# Patient Record
Sex: FEMALE | Race: BLACK OR AFRICAN AMERICAN | NOT HISPANIC OR LATINO | URBAN - METROPOLITAN AREA
[De-identification: names, ages, dates, MRNs, and addresses within clinical notes are randomized per-mention and may not be internally consistent; named-entity substitution may affect disease eponyms.]

---

## 2024-04-05 ENCOUNTER — OV NP (OUTPATIENT)
Dept: URBAN - METROPOLITAN AREA CLINIC 115 | Facility: CLINIC | Age: 79
End: 2024-04-05
Payer: COMMERCIAL

## 2024-04-05 ENCOUNTER — LAB (OUTPATIENT)
Dept: URBAN - METROPOLITAN AREA CLINIC 115 | Facility: CLINIC | Age: 79
End: 2024-04-05

## 2024-04-05 VITALS
SYSTOLIC BLOOD PRESSURE: 131 MMHG | HEART RATE: 64 BPM | HEIGHT: 64 IN | RESPIRATION RATE: 15 BRPM | WEIGHT: 112.8 LBS | BODY MASS INDEX: 19.26 KG/M2 | DIASTOLIC BLOOD PRESSURE: 85 MMHG | TEMPERATURE: 97 F

## 2024-04-05 DIAGNOSIS — R19.5 LOOSE STOOLS: ICD-10-CM

## 2024-04-05 DIAGNOSIS — R10.13 EPIGASTRIC PAIN: ICD-10-CM

## 2024-04-05 DIAGNOSIS — Z86.010 PERSONAL HISTORY OF COLONIC POLYPS: ICD-10-CM

## 2024-04-05 DIAGNOSIS — R11.14 BILIOUS VOMITING WITH NAUSEA: ICD-10-CM

## 2024-04-05 PROBLEM — 428283002: Status: ACTIVE | Noted: 2024-04-05

## 2024-04-05 PROBLEM — 398032003: Status: ACTIVE | Noted: 2024-04-05

## 2024-04-05 PROBLEM — 71419002: Status: ACTIVE | Noted: 2024-04-05

## 2024-04-05 PROCEDURE — 99204 OFFICE O/P NEW MOD 45 MIN: CPT | Performed by: INTERNAL MEDICINE

## 2024-04-05 RX ORDER — MULTIVIT WITH IRON,MINERALS
AS DIRECTED LIQUID (ML) ORAL
Status: ACTIVE | COMMUNITY

## 2024-04-05 RX ORDER — MELOXICAM 7.5 MG/1
TABLET ORAL
Qty: 0 | Refills: 0 | Status: ON HOLD | COMMUNITY
Start: 1900-01-01

## 2024-04-05 RX ORDER — LOSARTAN POTASSIUM 50 MG/1
TABLET ORAL
Qty: 0 | Refills: 0 | Status: ON HOLD | COMMUNITY
Start: 1900-01-01

## 2024-04-05 RX ORDER — ASPIRIN 81 MG/1
1 TABLET TABLET, CHEWABLE ORAL ONCE A DAY
Status: ACTIVE | COMMUNITY

## 2024-04-05 RX ORDER — OMEPRAZOLE 40 MG/1
1 CAPSULE 30 MINUTES BEFORE MORNING MEAL CAPSULE, DELAYED RELEASE ORAL ONCE A DAY
Qty: 30 CAPSULE | Refills: 1 | OUTPATIENT
Start: 2024-04-05

## 2024-04-05 RX ORDER — BENAZEPRIL HCL 5 MG
1 TABLET TABLET ORAL ONCE A DAY
Status: ACTIVE | COMMUNITY

## 2024-04-05 RX ORDER — ATORVASTATIN CALCIUM 20 MG/1
1 TABLET TABLET, FILM COATED ORAL ONCE A DAY
Status: ACTIVE | COMMUNITY

## 2024-04-05 RX ORDER — AMLODIPINE BESYLATE 10 MG/1
TAKE 1 TABLET (10 MG) BY ORAL ROUTE ONCE DAILY TABLET ORAL 1
Qty: 0 | Refills: 0 | Status: ON HOLD | COMMUNITY
Start: 1900-01-01

## 2024-04-05 RX ORDER — HYDROCODONE BITARTRATE AND IBUPROFEN 7.5; 2 MG/1; MG/1
TABLET ORAL
Qty: 0 | Refills: 0 | Status: ON HOLD | COMMUNITY
Start: 1900-01-01

## 2024-04-05 RX ORDER — TIOTROPIUM BROMIDE INHALATION SPRAY 1.56 UG/1
INHALE 2 PUFFS (2.5 MCG) BY INHALATION ROUTE ONCE DAILY SPRAY, METERED RESPIRATORY (INHALATION) 1
Qty: 0 | Refills: 0 | Status: ON HOLD | COMMUNITY
Start: 1900-01-01

## 2024-04-05 RX ORDER — AMLODIPINE BESYLATE 10 MG/1
1 TABLET TABLET ORAL ONCE A DAY
Status: ACTIVE | COMMUNITY

## 2024-04-05 RX ORDER — TORSEMIDE 10 MG/1
1 TABLET TABLET ORAL ONCE A DAY
Status: ACTIVE | COMMUNITY

## 2024-04-05 RX ORDER — GABAPENTIN 400 MG/1
TAKE 1 CAPSULE (400 MG) BY ORAL ROUTE 3 TIMES PER DAY CAPSULE ORAL
Qty: 0 | Refills: 0 | Status: ON HOLD | COMMUNITY
Start: 1900-01-01

## 2024-04-05 RX ORDER — NORTRIPTYLINE HYDROCHLORIDE 25 MG/1
CAPSULE ORAL
Qty: 0 | Refills: 0 | Status: ON HOLD | COMMUNITY
Start: 1900-01-01

## 2024-04-05 RX ORDER — SERTRALINE 25 MG/1
1 TABLET TABLET, FILM COATED ORAL ONCE A DAY
Status: ACTIVE | COMMUNITY

## 2024-04-05 RX ORDER — SUCRALFATE 1 G/1
1 TABLET ON AN EMPTY STOMACH TABLET ORAL TWICE A DAY
Status: ACTIVE | COMMUNITY

## 2024-04-05 RX ORDER — POLYETHYLENE GLYCOL 3350, SODIUM SULFATE ANHYDROUS, SODIUM BICARBONATE, SODIUM CHLORIDE, POTASSIUM CHLORIDE 236; 22.74; 6.74; 5.86; 2.97 G/4L; G/4L; G/4L; G/4L; G/4L
4000ML POWDER, FOR SOLUTION ORAL ONCE
Qty: 4000 MILLILITER | Refills: 0 | OUTPATIENT
Start: 2024-04-05 | End: 2024-04-06

## 2024-04-05 RX ORDER — METFORMIN HYDROCHLORIDE 1000 MG/1
TAKE 1 TABLET (1,000 MG) BY ORAL ROUTE 2 TIMES PER DAY WITH MORNING AND EVENING MEALS TABLET, COATED ORAL 2
Qty: 0 | Refills: 0 | Status: ON HOLD | COMMUNITY
Start: 1900-01-01

## 2024-04-05 RX ORDER — METOCLOPRAMIDE 5 MG/ML
1 ML BEFORE MEALS INJECTION, SOLUTION INTRAMUSCULAR; INTRAVENOUS TWICE A DAY
Status: ACTIVE | COMMUNITY

## 2024-04-05 RX ORDER — HYDRALAZINE HYDROCHLORIDE 50 MG/1
TABLET ORAL
Qty: 0 | Refills: 0 | Status: ON HOLD | COMMUNITY
Start: 1900-01-01

## 2024-04-05 RX ORDER — CINACALCET 60 MG/1
1 TABLET WITH FOOD OR AFTER A MEAL TABLET, FILM COATED ORAL ONCE A DAY
Status: ACTIVE | COMMUNITY

## 2024-04-05 NOTE — HPI-TODAY'S VISIT:
78-year-old male patient came into the office with complaints of having significant weight loss loose stools a diarrhea and loss of appetite.  Patient stated she has been having poor appetite and also diarrhea all her life worsening in the past few months.  Patient is accompanied by her caregiver and also patient because of her dementia they requested that her daughter to be called over the phone and had phone conversation during the history and physical as well as the examination and plan discussion.  Patient has had a prior workup in 2018 had a colonoscopy that showed polyps hyperplastic polyp adenomatous polyp as well as microscopic random colon biopsies were negative for microscopic colitis at that time.  Patient reports having loose stools 1 to 2/day however caregiver has not noticed that.  Patient also has a history of back surgery.  Get her many medications at all reviewed she had been taking sucralfate as well as over-the-counter PPI and metoclopramide for nausea.  Patient denies any vomiting.  Patient reports 60 pound weight loss.  She is also seeing endocrinologist for parathyroid elevation and she is still noted to have increased calcium elevated at 10 currently she is on medications to correct that.  They do not recall having a CT scan of the abdomen pelvis.

## 2024-05-20 ENCOUNTER — TELEPHONE ENCOUNTER (OUTPATIENT)
Dept: URBAN - METROPOLITAN AREA CLINIC 115 | Facility: CLINIC | Age: 79
End: 2024-05-20

## 2024-06-05 ENCOUNTER — OFFICE VISIT (OUTPATIENT)
Dept: URBAN - METROPOLITAN AREA MEDICAL CENTER 31 | Facility: MEDICAL CENTER | Age: 79
End: 2024-06-05
Payer: MEDICARE

## 2024-06-05 ENCOUNTER — TELEPHONE ENCOUNTER (OUTPATIENT)
Dept: URBAN - METROPOLITAN AREA CLINIC 82 | Facility: CLINIC | Age: 79
End: 2024-06-05

## 2024-06-05 DIAGNOSIS — Z09 ENCOUNTER FOR COLONOSCOPY FOLLOWING COLON POLYP REMOVAL: ICD-10-CM

## 2024-06-05 DIAGNOSIS — K29.60 OTHER GASTRITIS WITHOUT BLEEDING: ICD-10-CM

## 2024-06-05 DIAGNOSIS — B37.81 CANDIDA: ICD-10-CM

## 2024-06-05 DIAGNOSIS — Z86.010 ADENOMAS PERSONAL HISTORY OF COLONIC POLYPS: ICD-10-CM

## 2024-06-05 PROCEDURE — 43239 EGD BIOPSY SINGLE/MULTIPLE: CPT | Performed by: INTERNAL MEDICINE

## 2024-06-05 PROCEDURE — G0105 COLORECTAL SCRN; HI RISK IND: HCPCS | Performed by: INTERNAL MEDICINE

## 2024-06-05 RX ORDER — TIOTROPIUM BROMIDE INHALATION SPRAY 1.56 UG/1
INHALE 2 PUFFS (2.5 MCG) BY INHALATION ROUTE ONCE DAILY SPRAY, METERED RESPIRATORY (INHALATION) 1
Qty: 0 | Refills: 0 | Status: ON HOLD | COMMUNITY
Start: 1900-01-01

## 2024-06-05 RX ORDER — LOSARTAN POTASSIUM 50 MG/1
TABLET ORAL
Qty: 0 | Refills: 0 | Status: ON HOLD | COMMUNITY
Start: 1900-01-01

## 2024-06-05 RX ORDER — GABAPENTIN 400 MG/1
TAKE 1 CAPSULE (400 MG) BY ORAL ROUTE 3 TIMES PER DAY CAPSULE ORAL
Qty: 0 | Refills: 0 | Status: ON HOLD | COMMUNITY
Start: 1900-01-01

## 2024-06-05 RX ORDER — SERTRALINE 25 MG/1
1 TABLET TABLET, FILM COATED ORAL ONCE A DAY
Status: ACTIVE | COMMUNITY

## 2024-06-05 RX ORDER — HYDRALAZINE HYDROCHLORIDE 50 MG/1
TABLET ORAL
Qty: 0 | Refills: 0 | Status: ON HOLD | COMMUNITY
Start: 1900-01-01

## 2024-06-05 RX ORDER — CINACALCET 60 MG/1
1 TABLET WITH FOOD OR AFTER A MEAL TABLET, FILM COATED ORAL ONCE A DAY
Status: ACTIVE | COMMUNITY

## 2024-06-05 RX ORDER — OMEPRAZOLE 40 MG/1
TAKE 1 CAPSULE BY MOUTH EVERY DAY 30 MINUTES BEFORE MORNING MEAL FOR 30 DAYS CAPSULE, DELAYED RELEASE ORAL
Qty: 90 CAPSULE | Refills: 1 | Status: ACTIVE | COMMUNITY

## 2024-06-05 RX ORDER — NORTRIPTYLINE HYDROCHLORIDE 25 MG/1
CAPSULE ORAL
Qty: 0 | Refills: 0 | Status: ON HOLD | COMMUNITY
Start: 1900-01-01

## 2024-06-05 RX ORDER — TORSEMIDE 10 MG/1
1 TABLET TABLET ORAL ONCE A DAY
Status: ACTIVE | COMMUNITY

## 2024-06-05 RX ORDER — METFORMIN HYDROCHLORIDE 1000 MG/1
TAKE 1 TABLET (1,000 MG) BY ORAL ROUTE 2 TIMES PER DAY WITH MORNING AND EVENING MEALS TABLET, COATED ORAL 2
Qty: 0 | Refills: 0 | Status: ON HOLD | COMMUNITY
Start: 1900-01-01

## 2024-06-05 RX ORDER — HYDROCODONE BITARTRATE AND IBUPROFEN 7.5; 2 MG/1; MG/1
TABLET ORAL
Qty: 0 | Refills: 0 | Status: ON HOLD | COMMUNITY
Start: 1900-01-01

## 2024-06-05 RX ORDER — ASPIRIN 81 MG/1
1 TABLET TABLET, CHEWABLE ORAL ONCE A DAY
Status: ACTIVE | COMMUNITY

## 2024-06-05 RX ORDER — BENAZEPRIL HCL 5 MG
1 TABLET TABLET ORAL ONCE A DAY
Status: ACTIVE | COMMUNITY

## 2024-06-05 RX ORDER — MELOXICAM 7.5 MG/1
TABLET ORAL
Qty: 0 | Refills: 0 | Status: ON HOLD | COMMUNITY
Start: 1900-01-01

## 2024-06-05 RX ORDER — AMLODIPINE BESYLATE 10 MG/1
1 TABLET TABLET ORAL ONCE A DAY
Status: ACTIVE | COMMUNITY

## 2024-06-05 RX ORDER — ATORVASTATIN CALCIUM 20 MG/1
1 TABLET TABLET, FILM COATED ORAL ONCE A DAY
Status: ACTIVE | COMMUNITY

## 2024-06-05 RX ORDER — METOCLOPRAMIDE 5 MG/ML
1 ML BEFORE MEALS INJECTION, SOLUTION INTRAMUSCULAR; INTRAVENOUS TWICE A DAY
Status: ACTIVE | COMMUNITY

## 2024-06-05 RX ORDER — FLUCONAZOLE 100 MG/1
2 TABS DAY ONE, THEN 1 TAB FOR 13 DAYS TABLET ORAL ONCE A DAY
Qty: 15 TABLET | Refills: 0 | OUTPATIENT
Start: 2024-06-05 | End: 2024-06-19

## 2024-06-05 RX ORDER — AMLODIPINE BESYLATE 10 MG/1
TAKE 1 TABLET (10 MG) BY ORAL ROUTE ONCE DAILY TABLET ORAL 1
Qty: 0 | Refills: 0 | Status: ON HOLD | COMMUNITY
Start: 1900-01-01

## 2024-06-05 RX ORDER — PANTOPRAZOLE SODIUM 40 MG/1
1 TABLET TABLET, DELAYED RELEASE ORAL ONCE A DAY
Qty: 30 TABLET | Refills: 1 | OUTPATIENT
Start: 2024-06-05

## 2024-06-05 RX ORDER — SUCRALFATE 1 G/1
1 TABLET ON AN EMPTY STOMACH TABLET ORAL TWICE A DAY
Status: ACTIVE | COMMUNITY

## 2024-06-05 RX ORDER — MULTIVIT WITH IRON,MINERALS
AS DIRECTED LIQUID (ML) ORAL
Status: ACTIVE | COMMUNITY

## 2024-06-30 ENCOUNTER — ERX REFILL RESPONSE (OUTPATIENT)
Dept: URBAN - METROPOLITAN AREA CLINIC 82 | Facility: CLINIC | Age: 79
End: 2024-06-30

## 2024-06-30 RX ORDER — PANTOPRAZOLE SODIUM 40 MG/1
1 TABLET TABLET, DELAYED RELEASE ORAL ONCE A DAY
Qty: 30 TABLET | Refills: 1 | OUTPATIENT

## 2024-06-30 RX ORDER — PANTOPRAZOLE SODIUM 40 MG/1
TAKE 1 TABLET BY MOUTH EVERY DAY FOR 30 DAYS TABLET, DELAYED RELEASE ORAL
Qty: 90 TABLET | Refills: 1 | OUTPATIENT